# Patient Record
Sex: MALE | Race: WHITE
[De-identification: names, ages, dates, MRNs, and addresses within clinical notes are randomized per-mention and may not be internally consistent; named-entity substitution may affect disease eponyms.]

---

## 2017-02-27 ENCOUNTER — HOSPITAL ENCOUNTER (OUTPATIENT)
Dept: HOSPITAL 47 - RADCTMAIN | Age: 62
End: 2017-02-27
Payer: COMMERCIAL

## 2017-02-27 DIAGNOSIS — K57.30: ICD-10-CM

## 2017-02-27 DIAGNOSIS — N28.1: ICD-10-CM

## 2017-02-27 DIAGNOSIS — N20.0: Primary | ICD-10-CM

## 2017-02-27 PROCEDURE — 74176 CT ABD & PELVIS W/O CONTRAST: CPT

## 2017-02-27 NOTE — CT
EXAMINATION TYPE: CT abdomen pelvis wo con

 

DATE OF EXAM: 2/27/2017 7:48 AM

 

COMPARISON: 8/11/2016

 

INDICATION: Right sided pain

 

DLP: 752 mGycm, Automated exposure control for dose reduction was used.

 

CONTRAST: None

                        Study performed without Oral Contrast

 

TECHNIQUE: Axial images were obtained from above the diaphragm to the pubic rami in the axial plane a
t 5 mm thick sections.  Reconstructed images are reviewed on the computer in the coronal plane.  

 

FINDINGS:

 

Limited CT sections are obtained the lung bases.  The lung bases are clear.

 

CT ABDOMEN:

 

Liver: Normal

 

Spleen: Normal

 

Pancreas: Normal

 

Adrenal glands: The adrenal glands are normal.

 

Gallbladder: Normal  

 

Kidneys: No masses are evident. No hydronephrosis is present.   There is a 1.8 cm cyst measuring -3 H
ounsfield units on the medial upper left kidney.  Couple of punctate cortical medullary calcification
s are within the left kidney. The largest calcifications are within the inferior pole measuring 0.3 a
nd 0.2 cm.

 

Aorta: Normal

 

Inferior vena cava: Normal.

 

CT PELVIS: 

Loops of bowel within the abdomen and pelvis are normal.     Few diverticuli within the sigmoid colon
. No inflammatory changes are adjacent.

 

Appendix: Normal as visualized.

 

Urinary bladder: Normal. 

 

Genitourinary structures: Multiple calcifications are in the slightly prominent prostate. There is so
me subtle asymmetry of the seminal vesicles was slightly larger left than right. This could be furthe
r evaluate with transrectal prostate sonography.

 

Osseous structures: No suspicious lytic or sclerotic lesions.

 

IMPRESSIONS:

1.  Scattered small nonobstructing left renal stones. 

2.  Left renal cyst

3. Few diverticuli within the sigmoid colon without acute diverticulitis.

## 2017-03-02 ENCOUNTER — HOSPITAL ENCOUNTER (OUTPATIENT)
Dept: HOSPITAL 47 - RADXRMAIN | Age: 62
Discharge: HOME | End: 2017-03-02
Attending: PHYSICIAN ASSISTANT
Payer: COMMERCIAL

## 2017-03-02 DIAGNOSIS — N20.0: Primary | ICD-10-CM

## 2017-03-02 PROCEDURE — 74000: CPT

## 2017-03-02 NOTE — XR
EXAMINATION TYPE: XR KUB

 

DATE OF EXAM: 3/2/2017 10:33 AM

 

HISTORY:  Pain

 

Comparison: None.Single KUB is submitted for interpretation.

 

Findings:

Right renal calculi: None Visualized.

 

Right ureteral calculi: None Visualized.  

 

Left renal calculi:  None Visualized.

 

Left ureteral calculi:  None Visualized.

 

Pelvic calcifications:  Phleboliths noted. Prostate calcifications also identified.

 

Bowel gas pattern is unremarkable.  No free air.  No mass effects.

 

IMPRESSION:

 

1.

## 2018-12-25 ENCOUNTER — HOSPITAL ENCOUNTER (EMERGENCY)
Dept: HOSPITAL 47 - EC | Age: 63
Discharge: HOME | End: 2018-12-25
Payer: COMMERCIAL

## 2018-12-25 VITALS — DIASTOLIC BLOOD PRESSURE: 68 MMHG | TEMPERATURE: 98.6 F | HEART RATE: 75 BPM | SYSTOLIC BLOOD PRESSURE: 106 MMHG

## 2018-12-25 VITALS — RESPIRATION RATE: 18 BRPM

## 2018-12-25 DIAGNOSIS — J44.9: ICD-10-CM

## 2018-12-25 DIAGNOSIS — R07.9: Primary | ICD-10-CM

## 2018-12-25 DIAGNOSIS — Z82.49: ICD-10-CM

## 2018-12-25 DIAGNOSIS — R06.00: ICD-10-CM

## 2018-12-25 DIAGNOSIS — J02.9: ICD-10-CM

## 2018-12-25 DIAGNOSIS — E78.00: ICD-10-CM

## 2018-12-25 DIAGNOSIS — F17.200: ICD-10-CM

## 2018-12-25 DIAGNOSIS — Z79.899: ICD-10-CM

## 2018-12-25 DIAGNOSIS — I10: ICD-10-CM

## 2018-12-25 LAB
ALBUMIN SERPL-MCNC: 4.9 G/DL (ref 3.5–5)
ALP SERPL-CCNC: 50 U/L (ref 38–126)
ALT SERPL-CCNC: 37 U/L (ref 21–72)
ANION GAP SERPL CALC-SCNC: 11 MMOL/L
APTT BLD: 23.3 SEC (ref 22–30)
AST SERPL-CCNC: 31 U/L (ref 17–59)
BASOPHILS # BLD AUTO: 0.1 K/UL (ref 0–0.2)
BASOPHILS NFR BLD AUTO: 1 %
BUN SERPL-SCNC: 17 MG/DL (ref 9–20)
CALCIUM SPEC-MCNC: 10.4 MG/DL (ref 8.4–10.2)
CHLORIDE SERPL-SCNC: 103 MMOL/L (ref 98–107)
CK SERPL-CCNC: 102 U/L (ref 55–170)
CO2 SERPL-SCNC: 24 MMOL/L (ref 22–30)
EOSINOPHIL # BLD AUTO: 0.2 K/UL (ref 0–0.7)
EOSINOPHIL NFR BLD AUTO: 3 %
ERYTHROCYTE [DISTWIDTH] IN BLOOD BY AUTOMATED COUNT: 5.02 M/UL (ref 4.3–5.9)
ERYTHROCYTE [DISTWIDTH] IN BLOOD: 11.7 % (ref 11.5–15.5)
GLUCOSE SERPL-MCNC: 117 MG/DL (ref 74–99)
HCT VFR BLD AUTO: 47.9 % (ref 39–53)
HGB BLD-MCNC: 15.3 GM/DL (ref 13–17.5)
INR PPP: 0.9 (ref ?–1.2)
LYMPHOCYTES # SPEC AUTO: 1.3 K/UL (ref 1–4.8)
LYMPHOCYTES NFR SPEC AUTO: 18 %
MAGNESIUM SPEC-SCNC: 1.9 MG/DL (ref 1.6–2.3)
MCH RBC QN AUTO: 30.5 PG (ref 25–35)
MCHC RBC AUTO-ENTMCNC: 32 G/DL (ref 31–37)
MCV RBC AUTO: 95.5 FL (ref 80–100)
MONOCYTES # BLD AUTO: 0.5 K/UL (ref 0–1)
MONOCYTES NFR BLD AUTO: 7 %
NEUTROPHILS # BLD AUTO: 5.2 K/UL (ref 1.3–7.7)
NEUTROPHILS NFR BLD AUTO: 68 %
PLATELET # BLD AUTO: 389 K/UL (ref 150–450)
POTASSIUM SERPL-SCNC: 4.6 MMOL/L (ref 3.5–5.1)
PROT SERPL-MCNC: 8.5 G/DL (ref 6.3–8.2)
PT BLD: 10 SEC (ref 9–12)
SODIUM SERPL-SCNC: 138 MMOL/L (ref 137–145)
TROPONIN I SERPL-MCNC: <0.012 NG/ML (ref 0–0.03)
WBC # BLD AUTO: 7.5 K/UL (ref 3.8–10.6)

## 2018-12-25 PROCEDURE — 82553 CREATINE MB FRACTION: CPT

## 2018-12-25 PROCEDURE — 99285 EMERGENCY DEPT VISIT HI MDM: CPT

## 2018-12-25 PROCEDURE — 85610 PROTHROMBIN TIME: CPT

## 2018-12-25 PROCEDURE — 84484 ASSAY OF TROPONIN QUANT: CPT

## 2018-12-25 PROCEDURE — 83735 ASSAY OF MAGNESIUM: CPT

## 2018-12-25 PROCEDURE — 85025 COMPLETE CBC W/AUTO DIFF WBC: CPT

## 2018-12-25 PROCEDURE — 80053 COMPREHEN METABOLIC PANEL: CPT

## 2018-12-25 PROCEDURE — 93005 ELECTROCARDIOGRAM TRACING: CPT

## 2018-12-25 PROCEDURE — 85730 THROMBOPLASTIN TIME PARTIAL: CPT

## 2018-12-25 PROCEDURE — 82550 ASSAY OF CK (CPK): CPT

## 2018-12-25 PROCEDURE — 36415 COLL VENOUS BLD VENIPUNCTURE: CPT

## 2018-12-25 PROCEDURE — 71046 X-RAY EXAM CHEST 2 VIEWS: CPT

## 2018-12-25 NOTE — ED
General Adult HPI





- General


Source: patient, RN notes reviewed


Mode of arrival: ambulatory


Limitations: no limitations





<David Lilly - Last Filed: 12/25/18 11:56>





<Johan Heredia - Last Filed: 12/25/18 12:50>





- General


Chief complaint: Chest Pain


Stated complaint: FELISHA


Time Seen by Provider: 12/25/18 11:00





- History of Present Illness


Initial comments: 





This is a 63-year-old male with past medical history significant for high 

cholesterol and high blood pressure.  Patient comes in today complaining that 

for a couple days he's had jaw pain and today he started having left-sided 

chest pain.  Patient states he also has had a little bit of a sore throat but 

that is improving.  Patient states he's mildly short of breath.  Patient denies 

any diaphoretic episodes.  Patient denies any abdominal pain.  Patient denies 

any nausea vomiting diarrhea.  Patient denies any palpitations.  Patient denies 

lightheadedness dizziness or near syncopal episode.  Patient denies any 

swelling to the legs or calf tenderness.  Patient denies any previous cardiac 

history. (David Lilly)





- Related Data


 Home Medications











 Medication  Instructions  Recorded  Confirmed


 


Atorvastatin [Lipitor] 10 mg PO DAILY 11/16/15 12/25/18


 


HYDROcodone/APAP 7.5-325MG [Norco 1 tab PO Q8HR PRN 12/25/18 12/25/18





7.5-325]   


 


Metoprolol Tartrate [Lopressor] 25 mg PO DAILY 12/25/18 12/25/18


 


amLODIPine BESYLATE/BENAZEPRIL 1 cap PO DAILY 12/25/18 12/25/18





[amLODIPine BESYLATE/BENAZEPRIL   





10-20 mg]   


 


hydrALAZINE HCL [Apresoline] 25 mg PO BID 12/25/18 12/25/18








 Previous Rx's











 Medication  Instructions  Recorded


 


Albuterol Sulfate [Proair Hfa] 1 - 2 puff INHALATION Q6HR PRN #1 12/25/18





 inhaler 











 Allergies











Allergy/AdvReac Type Severity Reaction Status Date / Time


 


No Known Allergies Allergy   Verified 12/25/18 11:21














Review of Systems


ROS Other: All systems not noted in ROS Statement are negative.





<David Lilly - Last Filed: 12/25/18 11:56>


ROS Other: All systems not noted in ROS Statement are negative.





<Johan Heredia - Last Filed: 12/25/18 12:50>


ROS Statement: 


Those systems with pertinent positive or pertinent negative responses have been 

documented in the HPI.








Past Medical History


Past Medical History: Asthma, Hypertension


Additional Past Medical History / Comment(s): hypercholesteremia; asthma as 

child


History of Any Multi-Drug Resistant Organisms: None Reported


Additional Past Surgical History / Comment(s): colonoscopy


Past Anesthesia/Blood Transfusion Reactions: No Reported Reaction


Past Psychological History: No Psychological Hx Reported


Smoking Status: Light tobacco smoker


Past Alcohol Use History: Daily


Past Drug Use History: None Reported





- Past Family History


  ** Father


Family Medical History: Congestive Heart Failure (CHF), Diabetes Mellitus


Additional Family Medical History / Comment(s): passed at age 62.





<David Lilly - Last Filed: 12/25/18 11:56>





General Exam


Limitations: no limitations





<David Lilly - Last Filed: 12/25/18 11:56>





<Johan Heredia - Last Filed: 12/25/18 12:50>





- General Exam Comments


Initial Comments: 





GENERAL:


Patient is well-developed and well-nourished.  Patient is nontoxic and well-

hydrated and is in mild distress.





ENT:


Neck is soft and supple.  No significant lymphadenopathy is noted.  Oropharynx 

is clear.  Moist mucous membranes.  Neck has full range of motion without 

eliciting any pain.





EYES:


The sclera were anicteric and conjunctiva were pink and moist.  Extraocular 

movements were intact and pupils were equal round and reactive to light.  

Eyelids were unremarkable.





PULMONARY:


Unlabored respirations.  Good breath sounds bilaterally.  No audible rales 

rhonchi or wheezing was noted.





CARDIOVASCULAR:


There is a regular rate and rhythm without any murmurs gallops or rubs.  

Femoral pulses are equal bilaterally





ABDOMEN:


Soft and nontender with normal bowel sounds.  No palpable organomegaly was 

noted.  There is no palpable pulsatile mass.





SKIN:


Skin is clear with no lesions or rashes and otherwise unremarkable.





NEUROLOGIC: 


Patient is alert and oriented 3 cranial nerves II through XII are grossly 

intact motor and sensory are also intact  





MUSCULOSKELETAL:


Normal extremities with adequate strength and full range of motion.  





LYMPHATICS:


No significant lymphadenopathy is noted





PSYCHIATRIC:


Normal psychiatric evaluation.   (David Lilly)





 Vital Signs











  12/25/18 12/25/18 12/25/18





  11:01 11:29 11:30


 


Temperature 98.0 F  


 


Pulse Rate 94  82


 


Respiratory 18  20





Rate   


 


Blood Pressure 158/99  137/82


 


O2 Sat by Pulse 99 97 96





Oximetry   














  12/25/18 12/25/18





  12:00 12:30


 


Temperature  


 


Pulse Rate 77 76


 


Respiratory 18 18





Rate  


 


Blood Pressure 131/79 126/78


 


O2 Sat by Pulse 97 97





Oximetry  














Medical Decision Making





- Lab Data


Result diagrams: 


 12/25/18 11:21





 12/25/18 11:21





<David Lilly - Last Filed: 12/25/18 11:56>





- Lab Data


Result diagrams: 


 12/25/18 11:21





 12/25/18 11:21





<Johan Heredia - Last Filed: 12/25/18 12:50>





- Medical Decision Making





EKG shows normal sinus rhythm at 89 bpm AL interval is 170 QRS is 86 QT 

interval 356 QTC is 433.  Patient's EKG shows no ST segment elevation or 

depression or T wave abnormalities are noted.





Dr. Heredia will be taking care of this patient at 12:00 (David Lilly)


Patient is reevaluated by myself.  He states that he did have mild sharp 

discomfort over the left chest without any diaphoresis, radiation to the 

shoulders.  No upper extremity issues.  Patient does have multiple risk 

factors.  Cardiac work up was unremarkable.  Chest x-ray showed findings of 

COPD.  Patient has a history of COPD.  Patient denies any cardiac history in 

himself.  At this point patient's clinical picture is consistent with atypical 

chest pain in individual with multiple risk factors.  At this point there are 

any high-risk features.  Patient appears comfortable at this time.  Discussed 

with patient that his disposition options are to remain observation for serial 

troponins and further cardiac workup however, patient requested that he be 

discharge.  He does live nearby and is under the watchful eye of his 

significant other.  Patient is warned that if he be discharge she can 

experiencing life-threatening cardiac morbidity or possibly even mortality.  

Patient still insists that he not stay in the hospital for further workup.  He 

will return if he develops any worsening chest pain.  Otherwise he is told to 

follow-up with his primary care doctor for COPD management and possibly 

outpatient cardiac workup.  Patient is understandable and agreeable to this 

disposition.  Patient is given prescription for albuterol inhaler when 

necessary shortness of breath.


 (Johan Heredia)





- Lab Data





 Lab Results











  12/25/18 12/25/18 12/25/18 Range/Units





  11:21 11:21 11:21 


 


WBC   7.5   (3.8-10.6)  k/uL


 


RBC   5.02   (4.30-5.90)  m/uL


 


Hgb   15.3   (13.0-17.5)  gm/dL


 


Hct   47.9   (39.0-53.0)  %


 


MCV   95.5   (80.0-100.0)  fL


 


MCH   30.5   (25.0-35.0)  pg


 


MCHC   32.0   (31.0-37.0)  g/dL


 


RDW   11.7   (11.5-15.5)  %


 


Plt Count   389   (150-450)  k/uL


 


Neutrophils %   68   %


 


Lymphocytes %   18   %


 


Monocytes %   7   %


 


Eosinophils %   3   %


 


Basophils %   1   %


 


Neutrophils #   5.2   (1.3-7.7)  k/uL


 


Lymphocytes #   1.3   (1.0-4.8)  k/uL


 


Monocytes #   0.5   (0-1.0)  k/uL


 


Eosinophils #   0.2   (0-0.7)  k/uL


 


Basophils #   0.1   (0-0.2)  k/uL


 


PT     (9.0-12.0)  sec


 


INR     (<1.2)  


 


APTT     (22.0-30.0)  sec


 


Sodium    138  (137-145)  mmol/L


 


Potassium    4.6  (3.5-5.1)  mmol/L


 


Chloride    103  ()  mmol/L


 


Carbon Dioxide    24  (22-30)  mmol/L


 


Anion Gap    11  mmol/L


 


BUN    17  (9-20)  mg/dL


 


Creatinine    0.74  (0.66-1.25)  mg/dL


 


Est GFR (CKD-EPI)AfAm    >90  (>60 ml/min/1.73 sqM)  


 


Est GFR (CKD-EPI)NonAf    >90  (>60 ml/min/1.73 sqM)  


 


Glucose    117 H  (74-99)  mg/dL


 


Calcium    10.4 H  (8.4-10.2)  mg/dL


 


Magnesium    1.9  (1.6-2.3)  mg/dL


 


Total Bilirubin    0.7  (0.2-1.3)  mg/dL


 


AST    31  (17-59)  U/L


 


ALT    37  (21-72)  U/L


 


Alkaline Phosphatase    50  ()  U/L


 


Total Creatine Kinase  102    ()  U/L


 


CK-MB (CK-2)  1.8    (0.0-2.4)  ng/mL


 


CK-MB (CK-2) Rel Index  1.8    


 


Troponin I  <0.012    (0.000-0.034)  ng/mL


 


Total Protein    8.5 H  (6.3-8.2)  g/dL


 


Albumin    4.9  (3.5-5.0)  g/dL














  12/25/18 Range/Units





  11:21 


 


WBC   (3.8-10.6)  k/uL


 


RBC   (4.30-5.90)  m/uL


 


Hgb   (13.0-17.5)  gm/dL


 


Hct   (39.0-53.0)  %


 


MCV   (80.0-100.0)  fL


 


MCH   (25.0-35.0)  pg


 


MCHC   (31.0-37.0)  g/dL


 


RDW   (11.5-15.5)  %


 


Plt Count   (150-450)  k/uL


 


Neutrophils %   %


 


Lymphocytes %   %


 


Monocytes %   %


 


Eosinophils %   %


 


Basophils %   %


 


Neutrophils #   (1.3-7.7)  k/uL


 


Lymphocytes #   (1.0-4.8)  k/uL


 


Monocytes #   (0-1.0)  k/uL


 


Eosinophils #   (0-0.7)  k/uL


 


Basophils #   (0-0.2)  k/uL


 


PT  10.0  (9.0-12.0)  sec


 


INR  0.9  (<1.2)  


 


APTT  23.3  (22.0-30.0)  sec


 


Sodium   (137-145)  mmol/L


 


Potassium   (3.5-5.1)  mmol/L


 


Chloride   ()  mmol/L


 


Carbon Dioxide   (22-30)  mmol/L


 


Anion Gap   mmol/L


 


BUN   (9-20)  mg/dL


 


Creatinine   (0.66-1.25)  mg/dL


 


Est GFR (CKD-EPI)AfAm   (>60 ml/min/1.73 sqM)  


 


Est GFR (CKD-EPI)NonAf   (>60 ml/min/1.73 sqM)  


 


Glucose   (74-99)  mg/dL


 


Calcium   (8.4-10.2)  mg/dL


 


Magnesium   (1.6-2.3)  mg/dL


 


Total Bilirubin   (0.2-1.3)  mg/dL


 


AST   (17-59)  U/L


 


ALT   (21-72)  U/L


 


Alkaline Phosphatase   ()  U/L


 


Total Creatine Kinase   ()  U/L


 


CK-MB (CK-2)   (0.0-2.4)  ng/mL


 


CK-MB (CK-2) Rel Index   


 


Troponin I   (0.000-0.034)  ng/mL


 


Total Protein   (6.3-8.2)  g/dL


 


Albumin   (3.5-5.0)  g/dL














Disposition





<David Lilly - Last Filed: 12/25/18 11:56>


Is patient prescribed a controlled substance at d/c from ED?: No


Time of Disposition: 12:48





<Johan Heredia - Last Filed: 12/25/18 12:50>


Clinical Impression: 


 Chest pain





Disposition: HOME SELF-CARE


Condition: Good


Instructions:  Chest Pain (ED)


Prescriptions: 


Albuterol Sulfate [Proair Hfa] 1 - 2 puff INHALATION Q6HR PRN #1 inhaler


 PRN Reason: Shortness Of Breath


Referrals: 


Andreina Mclean MD [Primary Care Provider] - 1-2 days

## 2018-12-25 NOTE — XR
EXAMINATION TYPE: XR chest 2V

 

DATE OF EXAM: 12/25/2018

 

COMPARISON: 12/12/2014

 

INDICATION: Chest pain short of breath

 

TECHNIQUE:  Frontal and lateral views of the chest are obtained.

 

FINDINGS:  

The heart size is normal.  

The pulmonary vasculature is normal.

The lungs are clear.  There is hyperinflation and flattening of diaphragms which could be related to 
COPD.

 

IMPRESSION:  

1. No acute pulmonary process.

2. Clinical correlation recommended for COPD.

## 2019-01-01 ENCOUNTER — HOSPITAL ENCOUNTER (EMERGENCY)
Dept: HOSPITAL 47 - EC | Age: 64
Discharge: HOME | End: 2019-01-01
Payer: COMMERCIAL

## 2019-01-01 VITALS — TEMPERATURE: 98.3 F

## 2019-01-01 VITALS — RESPIRATION RATE: 16 BRPM | HEART RATE: 91 BPM | DIASTOLIC BLOOD PRESSURE: 79 MMHG | SYSTOLIC BLOOD PRESSURE: 141 MMHG

## 2019-01-01 DIAGNOSIS — J32.4: Primary | ICD-10-CM

## 2019-01-01 DIAGNOSIS — I10: ICD-10-CM

## 2019-01-01 DIAGNOSIS — Z79.899: ICD-10-CM

## 2019-01-01 DIAGNOSIS — E78.00: ICD-10-CM

## 2019-01-01 DIAGNOSIS — F17.200: ICD-10-CM

## 2019-01-01 DIAGNOSIS — J44.9: ICD-10-CM

## 2019-01-01 LAB
ALBUMIN SERPL-MCNC: 4.8 G/DL (ref 3.5–5)
ALP SERPL-CCNC: 47 U/L (ref 38–126)
ALT SERPL-CCNC: 38 U/L (ref 21–72)
ANION GAP SERPL CALC-SCNC: 10 MMOL/L
AST SERPL-CCNC: 27 U/L (ref 17–59)
BASOPHILS # BLD AUTO: 0.1 K/UL (ref 0–0.2)
BASOPHILS NFR BLD AUTO: 1 %
BUN SERPL-SCNC: 14 MG/DL (ref 9–20)
CALCIUM SPEC-MCNC: 10 MG/DL (ref 8.4–10.2)
CHLORIDE SERPL-SCNC: 103 MMOL/L (ref 98–107)
CO2 SERPL-SCNC: 24 MMOL/L (ref 22–30)
EOSINOPHIL # BLD AUTO: 0.3 K/UL (ref 0–0.7)
EOSINOPHIL NFR BLD AUTO: 4 %
ERYTHROCYTE [DISTWIDTH] IN BLOOD BY AUTOMATED COUNT: 4.77 M/UL (ref 4.3–5.9)
ERYTHROCYTE [DISTWIDTH] IN BLOOD: 11.9 % (ref 11.5–15.5)
GLUCOSE SERPL-MCNC: 106 MG/DL (ref 74–99)
HCT VFR BLD AUTO: 45.4 % (ref 39–53)
HGB BLD-MCNC: 15 GM/DL (ref 13–17.5)
LYMPHOCYTES # SPEC AUTO: 1.5 K/UL (ref 1–4.8)
LYMPHOCYTES NFR SPEC AUTO: 21 %
MCH RBC QN AUTO: 31.4 PG (ref 25–35)
MCHC RBC AUTO-ENTMCNC: 33 G/DL (ref 31–37)
MCV RBC AUTO: 95.1 FL (ref 80–100)
MONOCYTES # BLD AUTO: 0.4 K/UL (ref 0–1)
MONOCYTES NFR BLD AUTO: 6 %
NEUTROPHILS # BLD AUTO: 4.7 K/UL (ref 1.3–7.7)
NEUTROPHILS NFR BLD AUTO: 66 %
PLATELET # BLD AUTO: 297 K/UL (ref 150–450)
POTASSIUM SERPL-SCNC: 4.4 MMOL/L (ref 3.5–5.1)
PROT SERPL-MCNC: 7.9 G/DL (ref 6.3–8.2)
SODIUM SERPL-SCNC: 137 MMOL/L (ref 137–145)
WBC # BLD AUTO: 7.1 K/UL (ref 3.8–10.6)

## 2019-01-01 PROCEDURE — 71046 X-RAY EXAM CHEST 2 VIEWS: CPT

## 2019-01-01 PROCEDURE — 70491 CT SOFT TISSUE NECK W/DYE: CPT

## 2019-01-01 PROCEDURE — 99285 EMERGENCY DEPT VISIT HI MDM: CPT

## 2019-01-01 PROCEDURE — 93005 ELECTROCARDIOGRAM TRACING: CPT

## 2019-01-01 PROCEDURE — 87430 STREP A AG IA: CPT

## 2019-01-01 PROCEDURE — 80053 COMPREHEN METABOLIC PANEL: CPT

## 2019-01-01 PROCEDURE — 36415 COLL VENOUS BLD VENIPUNCTURE: CPT

## 2019-01-01 PROCEDURE — 87081 CULTURE SCREEN ONLY: CPT

## 2019-01-01 PROCEDURE — 85025 COMPLETE CBC W/AUTO DIFF WBC: CPT

## 2019-01-01 NOTE — CT
EXAMINATION TYPE: CT soft tissue neck w con

 

DATE OF EXAM: 1/1/2019 4:47 PM

 

COMPARISON: None

 

HISTORY: Swollen throat, FELISHA

 

CT DLP: 233.9 mGycm

Automated exposure control for dose reduction was used.

 

CONTRAST: 

CT scan of the neck is performed following with IV Contrast, patient injected with 100 mL of Isovue 3
00.  Axial images are obtained, coronal and sagittal reformatted images are reviewed.

 

FINDINGS:

 

There is normal branching pattern of the great vessels on the aortic arch. Thyroid gland is symmetric
. There is contrast opacification of the carotid arteries and jugular veins. There is bilateral contr
ast opacification of the vertebral arteries. Epiglottis appears normal. Visualized trachea appears no
rmal. There is no evidence of a pharyngeal mass. There is mucosal thickening in the maxillary and eth
moid and sphenoid sinuses.

 

Submandibular salivary glands are symmetric. Parotid glands are symmetric. There is bilateral calcifi
cation within the tonsils. There is also some calcification more laterally on the right and left side
 that raises the possibility of a parotid duct stone.

 

Cervical vertebra have fairly normal spacing and alignment. Posterior elements are intact. Prevertebr
al soft tissues are not enlarged.

IMPRESSION:  Pansinusitis.

 

Old granulomatous disease with calcification in the tonsils.

 

Possible stone in the right and left parotid duct.

## 2019-01-01 NOTE — XR
EXAMINATION TYPE: XR chest 2V

 

DATE OF EXAM: 1/1/2019

 

COMPARISON: 12/25/2018

 

HISTORY: Difficulty swallowing

 

TECHNIQUE:  Frontal and lateral views of the chest are obtained.

 

FINDINGS:  Heart and mediastinum are normal. Lungs are clear. Diaphragm is normal. Bony thorax appear
s normal.

 

IMPRESSION:  Normal chest. No change.

## 2019-01-15 ENCOUNTER — HOSPITAL ENCOUNTER (OUTPATIENT)
Dept: HOSPITAL 47 - 1SOBS | Age: 64
Setting detail: OBSERVATION
LOS: 1 days | Discharge: HOME | End: 2019-01-16
Payer: COMMERCIAL

## 2019-01-15 VITALS — RESPIRATION RATE: 18 BRPM

## 2019-01-15 DIAGNOSIS — E66.9: ICD-10-CM

## 2019-01-15 DIAGNOSIS — I10: ICD-10-CM

## 2019-01-15 DIAGNOSIS — Z79.899: ICD-10-CM

## 2019-01-15 DIAGNOSIS — Z79.891: ICD-10-CM

## 2019-01-15 DIAGNOSIS — R10.13: ICD-10-CM

## 2019-01-15 DIAGNOSIS — R14.0: ICD-10-CM

## 2019-01-15 DIAGNOSIS — R07.9: Primary | ICD-10-CM

## 2019-01-15 DIAGNOSIS — Z83.3: ICD-10-CM

## 2019-01-15 DIAGNOSIS — E78.5: ICD-10-CM

## 2019-01-15 DIAGNOSIS — Z87.09: ICD-10-CM

## 2019-01-15 DIAGNOSIS — Z87.891: ICD-10-CM

## 2019-01-15 DIAGNOSIS — Z82.49: ICD-10-CM

## 2019-01-15 DIAGNOSIS — F10.10: ICD-10-CM

## 2019-01-15 DIAGNOSIS — E78.00: ICD-10-CM

## 2019-01-15 DIAGNOSIS — J44.9: ICD-10-CM

## 2019-01-15 LAB
ALBUMIN SERPL-MCNC: 4.8 G/DL (ref 3.5–5)
ALP SERPL-CCNC: 43 U/L (ref 38–126)
ALT SERPL-CCNC: 43 U/L (ref 21–72)
ANION GAP SERPL CALC-SCNC: 12 MMOL/L
AST SERPL-CCNC: 31 U/L (ref 17–59)
BASOPHILS # BLD AUTO: 0.1 K/UL (ref 0–0.2)
BASOPHILS NFR BLD AUTO: 1 %
BUN SERPL-SCNC: 8 MG/DL (ref 9–20)
CALCIUM SPEC-MCNC: 10 MG/DL (ref 8.4–10.2)
CHLORIDE SERPL-SCNC: 102 MMOL/L (ref 98–107)
CK SERPL-CCNC: 85 U/L (ref 55–170)
CO2 SERPL-SCNC: 24 MMOL/L (ref 22–30)
EOSINOPHIL # BLD AUTO: 0.5 K/UL (ref 0–0.7)
EOSINOPHIL NFR BLD AUTO: 6 %
ERYTHROCYTE [DISTWIDTH] IN BLOOD BY AUTOMATED COUNT: 4.98 M/UL (ref 4.3–5.9)
ERYTHROCYTE [DISTWIDTH] IN BLOOD: 11.6 % (ref 11.5–15.5)
GLUCOSE SERPL-MCNC: 112 MG/DL (ref 74–99)
HCT VFR BLD AUTO: 47.5 % (ref 39–53)
HGB BLD-MCNC: 15.4 GM/DL (ref 13–17.5)
LYMPHOCYTES # SPEC AUTO: 1.6 K/UL (ref 1–4.8)
LYMPHOCYTES NFR SPEC AUTO: 20 %
MCH RBC QN AUTO: 30.9 PG (ref 25–35)
MCHC RBC AUTO-ENTMCNC: 32.4 G/DL (ref 31–37)
MCV RBC AUTO: 95.3 FL (ref 80–100)
MONOCYTES # BLD AUTO: 0.5 K/UL (ref 0–1)
MONOCYTES NFR BLD AUTO: 6 %
NEUTROPHILS # BLD AUTO: 5.5 K/UL (ref 1.3–7.7)
NEUTROPHILS NFR BLD AUTO: 66 %
PLATELET # BLD AUTO: 376 K/UL (ref 150–450)
POTASSIUM SERPL-SCNC: 4.4 MMOL/L (ref 3.5–5.1)
PROT SERPL-MCNC: 8 G/DL (ref 6.3–8.2)
SODIUM SERPL-SCNC: 138 MMOL/L (ref 137–145)
TROPONIN I SERPL-MCNC: <0.012 NG/ML (ref 0–0.03)
WBC # BLD AUTO: 8.3 K/UL (ref 3.8–10.6)

## 2019-01-15 PROCEDURE — 96372 THER/PROPH/DIAG INJ SC/IM: CPT

## 2019-01-15 PROCEDURE — 85379 FIBRIN DEGRADATION QUANT: CPT

## 2019-01-15 PROCEDURE — 85025 COMPLETE CBC W/AUTO DIFF WBC: CPT

## 2019-01-15 PROCEDURE — 93005 ELECTROCARDIOGRAM TRACING: CPT

## 2019-01-15 PROCEDURE — 82550 ASSAY OF CK (CPK): CPT

## 2019-01-15 PROCEDURE — 82553 CREATINE MB FRACTION: CPT

## 2019-01-15 PROCEDURE — 76705 ECHO EXAM OF ABDOMEN: CPT

## 2019-01-15 PROCEDURE — 71046 X-RAY EXAM CHEST 2 VIEWS: CPT

## 2019-01-15 PROCEDURE — 84484 ASSAY OF TROPONIN QUANT: CPT

## 2019-01-15 PROCEDURE — 80053 COMPREHEN METABOLIC PANEL: CPT

## 2019-01-15 PROCEDURE — 93306 TTE W/DOPPLER COMPLETE: CPT

## 2019-01-15 PROCEDURE — 83880 ASSAY OF NATRIURETIC PEPTIDE: CPT

## 2019-01-15 RX ADMIN — HEPARIN SODIUM SCH UNIT: 5000 INJECTION, SOLUTION INTRAVENOUS; SUBCUTANEOUS at 20:50

## 2019-01-15 NOTE — XR
EXAMINATION: XR chest 2V

DATE AND TIME:  1/15/2019 5:44 PM

 

CLINICAL INDICATION: [facility]; direct admit chest pain

 

TECHNIQUE: Departmental protocol

 

COMPARISON: 1/1/2019

 

FINDINGS: 

The lungs are clear. 

 

The pleural spaces are negative.

 

The cardiac silhouette is not enlarged. The remainder of the mediastinal silhouette is unremarkable. 


 

The skeletal structures and soft tissues are negative for acute findings.

 

IMPRESSION:

NO ACUTE PROCESS.

## 2019-01-16 VITALS — DIASTOLIC BLOOD PRESSURE: 78 MMHG | SYSTOLIC BLOOD PRESSURE: 124 MMHG | TEMPERATURE: 98.2 F | HEART RATE: 64 BPM

## 2019-01-16 LAB
ALBUMIN SERPL-MCNC: 4.1 G/DL (ref 3.5–5)
ALP SERPL-CCNC: 39 U/L (ref 38–126)
ALT SERPL-CCNC: 39 U/L (ref 21–72)
ANION GAP SERPL CALC-SCNC: 7 MMOL/L
AST SERPL-CCNC: 26 U/L (ref 17–59)
BASOPHILS # BLD AUTO: 0.1 K/UL (ref 0–0.2)
BASOPHILS NFR BLD AUTO: 1 %
BUN SERPL-SCNC: 10 MG/DL (ref 9–20)
CALCIUM SPEC-MCNC: 9.9 MG/DL (ref 8.4–10.2)
CHLORIDE SERPL-SCNC: 105 MMOL/L (ref 98–107)
CO2 SERPL-SCNC: 28 MMOL/L (ref 22–30)
EOSINOPHIL # BLD AUTO: 0.4 K/UL (ref 0–0.7)
EOSINOPHIL NFR BLD AUTO: 7 %
ERYTHROCYTE [DISTWIDTH] IN BLOOD BY AUTOMATED COUNT: 4.49 M/UL (ref 4.3–5.9)
ERYTHROCYTE [DISTWIDTH] IN BLOOD: 11.9 % (ref 11.5–15.5)
GLUCOSE SERPL-MCNC: 106 MG/DL (ref 74–99)
HCT VFR BLD AUTO: 42.8 % (ref 39–53)
HGB BLD-MCNC: 14.5 GM/DL (ref 13–17.5)
LYMPHOCYTES # SPEC AUTO: 1.5 K/UL (ref 1–4.8)
LYMPHOCYTES NFR SPEC AUTO: 23 %
MCH RBC QN AUTO: 32.2 PG (ref 25–35)
MCHC RBC AUTO-ENTMCNC: 33.8 G/DL (ref 31–37)
MCV RBC AUTO: 95.3 FL (ref 80–100)
MONOCYTES # BLD AUTO: 0.4 K/UL (ref 0–1)
MONOCYTES NFR BLD AUTO: 6 %
NEUTROPHILS # BLD AUTO: 3.8 K/UL (ref 1.3–7.7)
NEUTROPHILS NFR BLD AUTO: 61 %
PLATELET # BLD AUTO: 341 K/UL (ref 150–450)
POTASSIUM SERPL-SCNC: 4.8 MMOL/L (ref 3.5–5.1)
PROT SERPL-MCNC: 6.7 G/DL (ref 6.3–8.2)
SODIUM SERPL-SCNC: 140 MMOL/L (ref 137–145)
WBC # BLD AUTO: 6.2 K/UL (ref 3.8–10.6)

## 2019-01-16 RX ADMIN — HEPARIN SODIUM SCH UNIT: 5000 INJECTION, SOLUTION INTRAVENOUS; SUBCUTANEOUS at 10:42

## 2019-01-16 NOTE — P.CRDCN
History of Present Illness


History of present illness: 


This is a pleasant 63-year-old  male past medical history significant 

for dyslipidemia, COPD, hypertension and daily alcohol abuse.  He was sent to 

the hospital by his primary care physician for symptoms of epigastric pain and 

bloating.  We have been asked to see him in consultation for chest discomfort.  

He denies ever having any heavy pressure sensation in the precordial region.  

He states every time after he eat he feels his stomach becomes extremely 

bloated and he is a tender sensation to the right upper quadrant.  He denies 

pain in the back, arms, neck or jaw.  He denies associated shortness of breath, 

dizziness, palpitations, nausea, vomiting or diaphoresis.





EKG reveals sinus mechanism.


Chest x-ray is negative for an acute cardiopulmonary process.


Laboratory data reviewed, WBC 6.2, hemoglobin 14.5, platelets 341, d-dimer 0.23

, sodium 140, potassium 4.8, creatinine 0.77, cardiac enzymes negative 3, 

NTproBNP 49.


Current cardiac medications include atorvastatin 10 mg daily, Lopressor 25 mg 

daily, hydralazine 25 mg twice a day and amlodipine/benazepril 10/20 mg daily.





At the time of my exam:


CONSTITUTIONAL: Denies fever. Denies chills.


EYES: Denies blurred vision. Denies vision changes. Denies eye pain.


EARS, NOSE, MOUTH & THROAT: Denies headache. Denies sore throat. Denies ear 

pain.


CARDIOVASCULAR: Denies chest pain. Denies shortness of breath. Denies 

orthopnea. Denies PND. Denies palpitations.


RESPIRATORY: Denies cough. 


GASTROINTESTINAL: Denies abdominal pain. Denies diarrhea. Denies constipation. 

Denies nausea. Denies vomiting.


MUSCULOSKELETAL: Denies myalgias.


INTEGUMENTARY: Denies pruitis. Denies rash.


NEUROLOGIC: Denies numbness. Denies tingling. Denies weakness.


PSYCHIATRIC: Denies anxiety. Denies depression.


ENDOCRINE: Denies fatigue. Denies weight change. Denies polydipsia. Denies 

polyurina.


GENITOURINARY: Denies burning, hematuria or urgency with micturation.


HEMATOLOGIC: Denies history of anemia. Denies bleeding. 





Blood pressure 121/76 heart rate 69 afebrile maintaining oxygen saturation on 

room air


GENERAL: This is a 63-year-old  male in no apparent distress at the 

time of my examination.


HEENT: Head is atraumatic, normocephalic. Pupils are equal, round. Sclerae 

anicteric. Conjunctivae are clear. Mucous membranes of the mouth are moist. 

Neck is supple. There is no jugular venous distention. No carotid bruit is 

heard.


LUNGS: Clear to auscultation no wheezes, rales or rhonchi. No chest wall 

tenderness is noted on palpation or with deep breathing.


HEART: Regular rate and rhythm without murmurs, rubs or gallops. S1 and S2 

heard.


ABDOMEN: Soft, nontender. Bowel sounds are heard. No organomegaly noted.


EXTREMITIES: No evidence of peripheral edema and no calf tenderness noted.


VASCULAR: Radial and dorsalis pedis pulses palpated, no evidence of clubbing.  


NEUROLOGIC: Patient is awake, alert and oriented x3.


 


ASSESSMENT


Epigastric and abdominal pain with bloating


Hypertension


Dyslipidemia


COPD


Daily alcohol intake


Former nicotine dependence





PLAN


Obtain ultrasound of the gallbladder and 2D echocardiogram. 


An acute coronary event has been ruled out. 


Symptoms not suggestive of angina. 


Stable from a cardiac perspective. 


Ongoing medical management of abdominal discomfort. 


Follow up with Dr. Dang in 3-4 weeks and will undergo outpatient stress 

testing once abdominal discomfort has been addressed. 


Alcohol cessation recommended. 


Thank you kindly for this consultation. 





Nurse Practitioner note has been reviewed, I agree with a documented findings 

and plan of care.  Patient was seen and examined.











Past Medical History


Past Medical History: Asthma, COPD, Hyperlipidemia, Hypertension


Additional Past Medical History / Comment(s): hypercholesteremia; asthma as 

child


History of Any Multi-Drug Resistant Organisms: None Reported


Additional Past Surgical History / Comment(s): colonoscopy, I&d lt axilla 

abcess in 2015 bx neg.


Past Anesthesia/Blood Transfusion Reactions: No Reported Reaction


Smoking Status: Former smoker





- Past Family History


  ** Mother


History Unknown: Yes





  ** Father


Family Medical History: Congestive Heart Failure (CHF), Diabetes Mellitus


Additional Family Medical History / Comment(s): passed at age 62.





Medications and Allergies


 Home Medications











 Medication  Instructions  Recorded  Confirmed  Type


 


Atorvastatin [Lipitor] 10 mg PO HS 11/16/15 01/15/19 History


 


HYDROcodone/APAP 7.5-325MG [Norco 1 tab PO TID PRN 12/25/18 01/15/19 History





7.5-325]    


 


Metoprolol Tartrate [Lopressor] 25 mg PO DAILY 12/25/18 01/15/19 History


 


amLODIPine BESYLATE/BENAZEPRIL 1 cap PO DAILY 12/25/18 01/15/19 History





[amLODIPine BESYLATE/BENAZEPRIL    





10-20 mg]    


 


hydrALAZINE HCL [Apresoline] 25 mg PO BID 12/25/18 01/15/19 History


 


Albuterol Inhaler [Ventolin Hfa 2 puff INHALATION RT-QID 01/01/19 01/15/19 

History





Inhaler]    











 Allergies











Allergy/AdvReac Type Severity Reaction Status Date / Time


 


No Known Allergies Allergy   Verified 01/15/19 20:37














Physical Exam


Vitals: 


 Vital Signs











  Temp Pulse Resp BP BP Pulse Ox


 


 01/16/19 07:25  97.9 F  69  18  121/76   96


 


 01/16/19 04:00  98 F  73  18   129/79  96


 


 01/16/19 00:00    18   


 


 01/15/19 23:54  97.6 F  80  18   119/79  97


 


 01/15/19 20:00    18   


 


 01/15/19 17:00  97.5 F L  99  18   124/88  97








 Intake and Output











 01/15/19 01/16/19 01/16/19





 22:59 06:59 14:59


 


Intake Total 236  


 


Balance 236  


 


Intake:   


 


  Oral 236  


 


Other:   


 


  Voiding Method Toilet Toilet 


 


  # Voids  2 


 


  Weight 70.76 kg  














Results





 01/16/19 05:22





 01/16/19 05:22


 Cardiac Enzymes











  01/15/19 01/15/19 01/15/19 Range/Units





  17:29 17:29 23:19 


 


AST  31    (17-59)  U/L


 


CK-MB (CK-2)   1.6   (0.0-2.4)  ng/mL


 


Troponin I   <0.012  <0.012  (0.000-0.034)  ng/mL














  01/16/19 Range/Units





  05:22 


 


AST   (17-59)  U/L


 


CK-MB (CK-2)   (0.0-2.4)  ng/mL


 


Troponin I  <0.012  (0.000-0.034)  ng/mL








 CBC











  01/15/19 Range/Units





  17:29 


 


WBC  8.3  (3.8-10.6)  k/uL


 


RBC  4.98  (4.30-5.90)  m/uL


 


Hgb  15.4  (13.0-17.5)  gm/dL


 


Hct  47.5  (39.0-53.0)  %


 


Plt Count  376  (150-450)  k/uL








 Comprehensive Metabolic Panel











  01/15/19 Range/Units





  17:29 


 


Sodium  138  (137-145)  mmol/L


 


Potassium  4.4  (3.5-5.1)  mmol/L


 


Chloride  102  ()  mmol/L


 


Carbon Dioxide  24  (22-30)  mmol/L


 


BUN  8 L  (9-20)  mg/dL


 


Creatinine  0.69  (0.66-1.25)  mg/dL


 


Glucose  112 H  (74-99)  mg/dL


 


Calcium  10.0  (8.4-10.2)  mg/dL


 


AST  31  (17-59)  U/L


 


ALT  43  (21-72)  U/L


 


Alkaline Phosphatase  43  ()  U/L


 


Total Protein  8.0  (6.3-8.2)  g/dL


 


Albumin  4.8  (3.5-5.0)  g/dL








 Current Medications











Generic Name Dose Route Start Last Admin





  Trade Name Freq  PRN Reason Stop Dose Admin


 


Hydrocodone Bitart/Acetaminophen  1 each  01/15/19 18:01  





  [place] 7.5-325  PO   





  Q8HR PRN   





  Pain   





     





     





     


 


Amlodipine Besylate  10 mg  01/16/19 09:00  





  Norvasc  PO   





  DAILY [facility]   





     





     





     





     


 


Atorvastatin Calcium  10 mg  01/15/19 21:00  01/15/19 20:50





  Lipitor  PO   10 mg





  HS MEGAN   Administration





     





     





     





     


 


Heparin Sodium (Porcine)  5,000 unit  01/15/19 21:00  01/15/19 20:50





  Heparin  SQ   5,000 unit





  Q12HR MEGAN   Administration





     





     





     





     


 


Hydralazine HCl  25 mg  01/15/19 21:00  01/15/19 20:50





  Apresoline  PO   25 mg





  BID MEGAN   Administration





     





     





     





     


 


Sodium Chloride  1,000 mls @ 20 mls/hr  01/15/19 17:30  





  Saline 0.9%  IV   





  .Q24H MEGAN   





     





     





     





     


 


Lisinopril  20 mg  01/16/19 09:00  





  Zestril  PO   





  DAILY [facility]   





     





     





     





     


 


Metoprolol Tartrate  25 mg  01/16/19 09:00  





  Lopressor  PO   





  DAILY [facility]   





     





     





     





     


 


Sodium Chloride  2 spray  01/16/19 03:39  01/16/19 03:53





  [place]  NASAL   2 spray





  QID PRN   Administration





  Congestion   





     





     





     








 Intake and Output











 01/15/19 01/16/19 01/16/19





 22:59 06:59 14:59


 


Intake Total 236  


 


Balance 236  


 


Intake:   


 


  Oral 236  


 


Other:   


 


  Voiding Method Toilet Toilet 


 


  # Voids  2 


 


  Weight 70.76 kg  








 





 01/15/19 17:29 





 01/15/19 17:29

## 2019-01-16 NOTE — US
EXAMINATION TYPE: US gallbladder

 

DATE OF EXAM: 1/16/2019

 

COMPARISON: None

 

CLINICAL HISTORY: abdominal pain. Bloating, acid reflex, pain

 

EXAM MEASUREMENTS:

 

Liver Length:  15.8 cm   

Gallbladder Wall:  0.2 cm   

CHD:  0.4 cm

Right Kidney:  9.7 x 5.4 x 5.4 cm

 

 

 

Pancreas:  Obscured by bowel gas

Liver:  wnl  

Gallbladder:  wnl

**Evidence for sonographic Alfaro's sign:  neg

CBD:  Obscured by overlying bowel gas 

CHD: wnl

Right Kidney:  wnl 

 

 

 

IMPRESSION: 

1. No suspicious acute changes.

## 2019-01-16 NOTE — P.DS
Providers


Date of admission: 


01/15/19 16:40





Expected date of discharge: 01/16/19


Attending physician: 


Andreina Mclean





Consults: 





 





01/15/19 17:16


Consult Physician Routine 


   Consulting Provider: Maddie Dang


   Consult Reason/Comments: chest pain


   Do you want consulting provider notified?: Yes


   Placement Type Exists?: Yes











Primary care physician: 


Andreina Caridad





[facility] Course: 





Discharge diagnosis


1.  Chest pain.  Troponins negative.  EKG ordered.  Cardiology services 

consulted.  Ultrasound of gallbladder completed showing no suspicious acute 

changes.  Chest x-ray completed showing no acute process.  Per cardiology and 

acute coronary event has been ruled out.  Patient to follow-up with Dr. Dang 

in 3-4 weeks and will undergo outpatient stress testing once abdominal 

discomfort has been addressed.  At this time will DC patient on Protonix twice 

a day and patient to follow-up with primary care provider





2.  History of asthma





3.  History of COPD





4.  History of hyperlipidemia





5.  History of essential hypertension.











Hospital course





This is a 63-year-old male patient who presented to the hospital with 

complaints of chest pain.  Patient states this has been occurring 

intermittently over the past week.  Patient states he often feels bloated in 

has increased belching that occurs with the chest pain.  Patient has a past 

medical history of asthma, COPD, hyperlipidemia, hypertension and ex-smoker.  

Chest x-ray completed showing no acute process.  Troponins negative.  EKG 

ordered.  Cardiology service is consulted.  Gallbladder ultrasound completed 

showing no suspicious acute changes.  At this time patient is asymptomatic.  

Patient denies chest pain or shortness of breath.  Patient denies nausea 

vomiting or diarrhea.  Patient denies any urinary burning or frequency





On 01/16/2019 patient is resting comfortably in bed.  Patient is alert and 

oriented 3.  Patient has been cleared from cardiology standpoint.  Ultrasound 

of gallbladder completed showing no suspicious acute changes.  Patient will be 

DC'd home on Protonix twice a day.  Patient advised to follow up with PCP and 

cardiology services for outpatient stress test in 3-4 weeks.  At this time 

patient denies chest pain or shortness breath.  Patient denies nausea vomiting 

or diarrhea.  Patient denies any urinary burning or frequency





I performed an examination of the patient and discussed their management with 

the Nurse Practitioner.  I have reviewed the Nurse Practitioner's notes and 

agree with the documented findings and plan of care


Patient Condition at Discharge: Stable





Plan - Discharge Summary


Discharge Rx Participant: No


New Discharge Prescriptions: 


No Action


   Atorvastatin [Lipitor] 10 mg PO HS


   hydrALAZINE HCL [Apresoline] 25 mg PO BID


   amLODIPine BESYLATE/BENAZEPRIL [amLODIPine BESYLATE/BENAZEPRIL 10-20 mg] 1 

cap PO DAILY


   Metoprolol Tartrate [Lopressor] 25 mg PO DAILY


   HYDROcodone/APAP 7.5-325MG [Norco 7.5-325] 1 tab PO TID PRN


     PRN Reason: Pain


   Albuterol Inhaler [Ventolin Hfa Inhaler] 2 puff INHALATION RT-QID


Discharge Medication List





Atorvastatin [Lipitor] 10 mg PO HS 11/16/15 [History]


HYDROcodone/APAP 7.5-325MG [Norco 7.5-325] 1 tab PO TID PRN 12/25/18 [History]


Metoprolol Tartrate [Lopressor] 25 mg PO DAILY 12/25/18 [History]


amLODIPine BESYLATE/BENAZEPRIL [amLODIPine BESYLATE/BENAZEPRIL 10-20 mg] 1 cap 

PO DAILY 12/25/18 [History]


hydrALAZINE HCL [Apresoline] 25 mg PO BID 12/25/18 [History]


Albuterol Inhaler [Ventolin Hfa Inhaler] 2 puff INHALATION RT-QID 01/01/19 [

History]








Follow up Appointment(s)/Referral(s): 


Maddie Dang MD [STAFF PHYSICIAN] - 3 Weeks

## 2019-01-16 NOTE — P.HPIM
History of Present Illness


H&P Date: 01/16/19





This is a 63-year-old male patient who presented to the hospital with 

complaints of chest pain.  Patient states this has been occurring 

intermittently over the past week.  Patient states he often feels bloated in 

has increased belching that occurs with the chest pain.  Patient has a past 

medical history of asthma, COPD, hyperlipidemia, hypertension and ex-smoker.  

Chest x-ray completed showing no acute process.  Troponins negative.  EKG 

ordered.  Cardiology service is consulted.  Gallbladder ultrasound completed 

showing no suspicious acute changes.  At this time patient is asymptomatic.  

Patient denies chest pain or shortness of breath.  Patient denies nausea 

vomiting or diarrhea.  Patient denies any urinary burning or frequency





Review of Systems





Please refer to HPI otherwise unremarkable





Past Medical History


Past Medical History: Asthma, COPD, Hyperlipidemia, Hypertension


Additional Past Medical History / Comment(s): hypercholesteremia; asthma as 

child


History of Any Multi-Drug Resistant Organisms: None Reported


Additional Past Surgical History / Comment(s): colonoscopy, I&d lt axilla 

abcess in 2015 bx neg.


Past Anesthesia/Blood Transfusion Reactions: No Reported Reaction


Smoking Status: Former smoker





- Past Family History


  ** Mother


History Unknown: Yes





  ** Father


Family Medical History: Congestive Heart Failure (CHF), Diabetes Mellitus


Additional Family Medical History / Comment(s): passed at age 62.





Medications and Allergies


 Home Medications











 Medication  Instructions  Recorded  Confirmed  Type


 


Atorvastatin [Lipitor] 10 mg PO HS 11/16/15 01/15/19 History


 


HYDROcodone/APAP 7.5-325MG [Norco 1 tab PO TID PRN 12/25/18 01/15/19 History





7.5-325]    


 


Metoprolol Tartrate [Lopressor] 25 mg PO DAILY 12/25/18 01/15/19 History


 


amLODIPine BESYLATE/BENAZEPRIL 1 cap PO DAILY 12/25/18 01/15/19 History





[amLODIPine BESYLATE/BENAZEPRIL    





10-20 mg]    


 


hydrALAZINE HCL [Apresoline] 25 mg PO BID 12/25/18 01/15/19 History


 


Albuterol Inhaler [Ventolin Hfa 2 puff INHALATION RT-QID 01/01/19 01/15/19 

History





Inhaler]    











 Allergies











Allergy/AdvReac Type Severity Reaction Status Date / Time


 


No Known Allergies Allergy   Verified 01/15/19 20:37














Physical Exam


Vitals: 


 Vital Signs











  Temp Pulse Resp BP BP Pulse Ox


 


 01/16/19 07:25  97.9 F  69  18  121/76   96


 


 01/16/19 04:00  98 F  73  18   129/79  96


 


 01/16/19 00:00    18   


 


 01/15/19 23:54  97.6 F  80  18   119/79  97


 


 01/15/19 20:00    18   


 


 01/15/19 17:00  97.5 F L  99  18   124/88  97








 Intake and Output











 01/15/19 01/16/19 01/16/19





 22:59 06:59 14:59


 


Intake Total 236  


 


Balance 236  


 


Intake:   


 


  Oral 236  


 


Other:   


 


  Voiding Method Toilet Toilet 


 


  # Voids  2 


 


  Weight 70.76 kg  














Head normocephalic


Neck supple


Lungs clear to auscultation bilaterally no wheezing or crackles


Heart regular rate and rhythm S1-S2, no rub or gallop


Abdomen is soft nontender nondistended positive bowel sounds no 

hepatosplenomegaly


Extremities no edema


Neuro alert and orientated to 3





Results


CBC & Chem 7: 


 01/16/19 05:22





 01/16/19 05:22


Labs: 


 Abnormal Lab Results - Last 24 Hours (Table)











  01/15/19 01/16/19 Range/Units





  17:29 05:22 


 


BUN  8 L   (9-20)  mg/dL


 


Glucose  112 H  106 H  (74-99)  mg/dL














Thrombosis Risk Factor Assmnt





- Choose All That Apply


Any of the Below Risk Factors Present?: Yes


Each Factor Represents 1 point: Abnormal pulmonary function (COPD), Obesity (

BMI >25)


Other Risk Factors: Yes


Each Risk Factor Represents 2 Points: Age 61-74 years


Thrombosis Risk Factor Assessment Total Risk Factor Score: 4


Thrombosis Risk Factor Assessment Level: Moderate Risk





Assessment and Plan


Assessment: 





1.  Chest pain.  Troponins negative.  EKG ordered.  Cardiology services 

consulted.  Ultrasound of gallbladder completed showing no suspicious acute 

changes.  Chest x-ray completed showing no acute process.





2.  History of asthma





3.  History of COPD





4.  History of hyperlipidemia





5.  History of essential hypertension.





DVT prophylaxis heparin.  GI prophylaxis Protonix


Time with Patient: Greater than 30 (Greater than 60% of the total time spent in 

counseling and coordination of care. I performed an examination of the patient 

and discussed their management with the Nurse Practitioner.  I have reviewed 

the Nurse Practitioner's notes and agree with the documented findings and plan 

of care)

## 2019-01-17 NOTE — ECHOF
Referral Reason:cp



MEASUREMENTS

--------

HEIGHT: 167.6 cm

WEIGHT: 70.8 kg

BP: 121/76

RVIDd:   3.5 cm     (< 3.3)

IVSd:   1.1 cm     (0.6 - 1.1)

LVIDd:   3.7 cm     (3.9 - 5.3)

LVPWd:   1.2 cm     (0.6 - 1.1)

IVSs:   1.5 cm

LVIDs:   2.9 cm

LVPWs:   1.9 cm

LA Diam:   3.6 cm     (2.7 - 3.8)

LAESV Index (A-L):   33.37 ml/m

Ao Diam:   3.0 cm     (2.0 - 3.7)

AV Cusp:   2.0 cm     (1.5 - 2.6)

EPSS:   0.2 cm

MV E Roberto Carlos:   0.66 m/s

MV DecT:   258 ms

MV A Roberto Carlos:   0.64 m/s

MV E/A Ratio:   1.03 

RAP:   5.00 mmHg

RVSP:   23.97 mmHg

MV EF SLOPE:   60.36 mm/s     (70 - 150)

MV EXCURSION:   1.50 cm     (> 18.000)







FINDINGS

--------

Sinus rhythm.

This was a technically good study.

The left ventricular size is normal.   There is borderline concentric left ventricular hypertrophy.  
 Overall left ventricular systolic function is normal with, an EF between 60 - 65 %.

The right ventricle is mildly enlarged.

LA is midly dilated 29-33ml/m2.

The right atrium is normal in size.

Trace amount of aortic regurgitation.

There is trace to mild mitral regurgitation.

Mild tricuspid regurgitation present.   Right ventricular systolic pressure is normal at < 35 mmHg.

Trace/mild (physiologic)  pulmonic regurgitation.

The aortic root size is normal.

Normal inferior vena cava with normal inspiratory collapse consistent with estimated right atrial pre
ssure of  5 mmHg.

There is no pericardial effusion.



CONCLUSIONS

--------

1. Sinus rhythm.

2. This was a technically good study.

3. The left ventricular size is normal.

4. There is borderline concentric left ventricular hypertrophy.

5. Overall left ventricular systolic function is normal with, an EF between 60 - 65 %.

6. The right ventricle is mildly enlarged.

7. LA is midly dilated 29-33ml/m2.

8. The right atrium is normal in size.

9. Trace amount of aortic regurgitation.

10. There is trace to mild mitral regurgitation.

11. Mild tricuspid regurgitation present.

12. Right ventricular systolic pressure is normal at < 35 mmHg.

13. Trace/mild (physiologic)  pulmonic regurgitation.

14. The aortic root size is normal.

15. Normal inferior vena cava with normal inspiratory collapse consistent with estimated right atrial
 pressure of  5 mmHg.

16. There is no pericardial effusion.





SONOGRAPHER: HARLAN Palumbo

## 2019-01-22 ENCOUNTER — HOSPITAL ENCOUNTER (OUTPATIENT)
Dept: HOSPITAL 47 - ORWHC2ENDO | Age: 64
Discharge: HOME | End: 2019-01-22
Payer: COMMERCIAL

## 2019-01-22 VITALS — BODY MASS INDEX: 24.2 KG/M2

## 2019-01-22 VITALS — HEART RATE: 66 BPM

## 2019-01-22 VITALS — SYSTOLIC BLOOD PRESSURE: 110 MMHG | RESPIRATION RATE: 18 BRPM | DIASTOLIC BLOOD PRESSURE: 73 MMHG

## 2019-01-22 VITALS — TEMPERATURE: 98.4 F

## 2019-01-22 DIAGNOSIS — Z79.891: ICD-10-CM

## 2019-01-22 DIAGNOSIS — Z87.891: ICD-10-CM

## 2019-01-22 DIAGNOSIS — M19.90: ICD-10-CM

## 2019-01-22 DIAGNOSIS — I10: ICD-10-CM

## 2019-01-22 DIAGNOSIS — K31.9: Primary | ICD-10-CM

## 2019-01-22 DIAGNOSIS — K29.70: ICD-10-CM

## 2019-01-22 DIAGNOSIS — K44.9: ICD-10-CM

## 2019-01-22 DIAGNOSIS — E78.5: ICD-10-CM

## 2019-01-22 DIAGNOSIS — Z79.899: ICD-10-CM

## 2019-01-22 DIAGNOSIS — K21.0: ICD-10-CM

## 2019-01-22 DIAGNOSIS — J44.9: ICD-10-CM

## 2019-01-22 PROCEDURE — 43239 EGD BIOPSY SINGLE/MULTIPLE: CPT

## 2019-01-22 PROCEDURE — 88305 TISSUE EXAM BY PATHOLOGIST: CPT

## 2019-01-22 NOTE — P.PCN
Date of Procedure: 01/22/19


Procedure(s) Performed: 


Procedure: Esophagogastroduodenoscopy and biopsy.





Preoperative diagnosis: Postprandial abdominal pain of recent onset and 

suspected reflux issues.





Postoperative diagnosis: 1. Small sliding hiatal hernia with no obvious 

esophagitis or complicated reflux disease.  2. Antral gastritis.  3. Multiple 

biopsies obtained from the duodenum, antrum and esophagus area





Preparation and sedation were provided by anesthesia.





Brief clinical history: The patient is a 63-year-old male who is scheduled for 

this evaluation because of recent onset of postprandial bloating and abdominal 

distress making him not able to eat.  He denies dysphagia as such.  He 

complains of regurgitation and burping and has lost 10 pounds in 2-3 weeks.





Procedure: With the patient on his left lateral decubitus position and after 

informed consent and adequate sedation, the video Olympus- video upper 

endoscope was used and was advanced under direct vision through the 

cricopharyngeus down the esophagus.  GE junction was around 38 cm from the 

incisors and there was a small sliding hiatal hernia but no obvious esophagitis 

or complicated reflux disease.  The endoscope was then passed into the stomach 

which was insufflated with air and inspected in detail including the retroflex 

view in the cardia.  There was prominent folds in the immediate prepyloric area 

and there was mottling and erythema consistent with gastritis but no ulcers or 

gastric outlet obstruction.  Pyloric channel did not show any ulcers.  Duodenal 

bulb, post bulbar area and descending duodenum appeared within normal limits.  

Because of his symptoms, I obtained biopsies from the duodenum, antrum and 

esophagus then the endoscope was withdrawn.





The patient tolerated the procedure well.





Plan: The patient was reassured.  Will await biopsy results.  Further plans can 

be made based on his course and biopsy results.  He will follow up with you as 

planned and I will be happy to see in the office of his symptoms persist.

## 2019-06-28 ENCOUNTER — HOSPITAL ENCOUNTER (OUTPATIENT)
Dept: HOSPITAL 47 - RADCTMAIN | Age: 64
Discharge: HOME | End: 2019-06-28
Payer: MEDICARE

## 2019-06-28 DIAGNOSIS — J32.4: Primary | ICD-10-CM

## 2019-06-28 PROCEDURE — 70486 CT MAXILLOFACIAL W/O DYE: CPT

## 2019-06-28 NOTE — CT
EXAMINATION TYPE: CT sinus wo con

 

DATE OF EXAM: 6/28/2019

 

COMPARISON: None

 

HISTORY: chronic sinus congestion

 

CT DLP: 440.7 mGycm

 

Unenhanced CT of the paranasal sinuses was performed in the axial and coronal planes.  Bone and soft 
tissue settings are submitted.

 

There is moderate opacification of the maxillary sinuses,  sphenoid sinus and frontal sinus. There is
 severe mucosal thickening or underlying polyposis involving the ethmoid air cells.

 

The osteal meatal units are obstructed bilaterally.

 

The nasal septum is midline.  

 

No bony destructive changes are seen within the field of view.

 

IMPRESSION: 

 

Moderately severe pansinusitis. Underlying polyposis is not excluded.

## 2022-09-15 NOTE — ED
General Adult HPI





- General


Chief complaint: Shortness of Breath


Stated complaint: Swollen throat/hard time breathing


Time Seen by Provider: 01/01/19 14:00


Source: patient, RN notes reviewed


Mode of arrival: ambulatory


Limitations: no limitations





- History of Present Illness


Initial comments: 





This a 63-year-old male presents emergency Department complaining that he has 

throat pain since Christmas.  Patient states the pain is been getting worse 

since Christmas and he states he feels like sometimes he can't get his breath.  

Patient states since she's been sitting here couple hours in the waiting room 

he feels as though that sensation has improved but the pain remains.  Patient 

denies any fever chills.  Patient denies any chest pain.  Patient denies any 

cough.  Patient denies abdominal pain patient denies nausea or vomiting.  

Patient states she's been able to eat fine and when he wants to take a deep 

breath he can take a deep breath.





- Related Data


 Home Medications











 Medication  Instructions  Recorded  Confirmed


 


Atorvastatin [Lipitor] 10 mg PO DAILY 11/16/15 01/01/19


 


HYDROcodone/APAP 7.5-325MG [Norco 1 tab PO Q8HR PRN 12/25/18 01/01/19





7.5-325]   


 


Metoprolol Tartrate [Lopressor] 25 mg PO DAILY 12/25/18 01/01/19


 


amLODIPine BESYLATE/BENAZEPRIL 1 cap PO DAILY 12/25/18 01/01/19





[amLODIPine BESYLATE/BENAZEPRIL   





10-20 mg]   


 


hydrALAZINE HCL [Apresoline] 25 mg PO BID 12/25/18 01/01/19


 


Albuterol Inhaler [Ventolin Hfa 1 - 2 puff INHALATION RT-Q6H PRN 01/01/19 01/01/ 19





Inhaler]   








 Previous Rx's











 Medication  Instructions  Recorded


 


Amoxicillin/Potassium Clav 1 each PO Q12HR #28 tab 01/01/19





[Augmentin 875-125 Tablet]  











 Allergies











Allergy/AdvReac Type Severity Reaction Status Date / Time


 


No Known Allergies Allergy   Verified 01/01/19 15:15














Review of Systems


ROS Statement: 


Those systems with pertinent positive or pertinent negative responses have been 

documented in the HPI.





ROS Other: All systems not noted in ROS Statement are negative.





Past Medical History


Past Medical History: Asthma, COPD, Hypertension


Additional Past Medical History / Comment(s): hypercholesteremia; asthma as 

child


History of Any Multi-Drug Resistant Organisms: None Reported


Additional Past Surgical History / Comment(s): colonoscopy


Past Anesthesia/Blood Transfusion Reactions: No Reported Reaction


Past Psychological History: No Psychological Hx Reported


Smoking Status: Light tobacco smoker


Past Alcohol Use History: Daily


Past Drug Use History: None Reported





- Past Family History


  ** Father


Family Medical History: Congestive Heart Failure (CHF), Diabetes Mellitus


Additional Family Medical History / Comment(s): passed at age 62.





General Exam





- General Exam Comments


Initial Comments: 





GENERAL:


Patient is well-developed and well-nourished.  Patient is nontoxic and well-

hydrated and is in no acute distress.





ENT:


Neck is soft and supple.  No significant lymphadenopathy is noted.  Oropharynx 

is clear.  Moist mucous membranes.  Neck has full range of motion without 

eliciting any pain.  I saw no swelling in the oropharynx or any erythema.  

There was no fullness on palpation or masses palpated on an examination of the 

neck





EYES:


The sclera were anicteric and conjunctiva were pink and moist.  Extraocular 

movements were intact and pupils were equal round and reactive to light.  

Eyelids were unremarkable.





PULMONARY:


Unlabored respirations.  Good breath sounds bilaterally.  No audible rales 

rhonchi or wheezing was noted.





CARDIOVASCULAR:


There is a regular rate and rhythm without any murmurs gallops or rubs.





ABDOMEN:


Soft and nontender with normal bowel sounds.  





SKIN:


Skin is clear with no lesions or rashes and otherwise unremarkable.





NEUROLOGIC:


Patient is alert and oriented x3.  Cranial nerves II through XII are grossly 

intact.  Motor and sensory are also intact.  Normal speech, volume and content.

  Symmetrical smile. 





MUSCULOSKELETAL:


Normal extremities with adequate strength and full range of motion.  





LYMPHATICS:


No significant lymphadenopathy is noted





PSYCHIATRIC:


Normal psychiatric evaluation.  


Limitations: no limitations





Course


 Vital Signs











  01/01/19





  13:54


 


Temperature 98.3 F


 


Pulse Rate 104 H


 


Respiratory 20





Rate 


 


Blood Pressure 156/89


 


O2 Sat by Pulse 98





Oximetry 














Medical Decision Making





- Medical Decision Making





EKG shows normal sinus rhythm at 82 bpm ND interval is on her 72 QRS is 74 QT 

interval 360 QTC is 429.  Patient's EKG shows no ST segment elevation or 

depression or T wave abnormalities are noted.





Patient's CAT scan of the soft tissue of the neck showed maxillary and ethmoid 

sinusitis but no explanation for the throat soreness.





- Lab Data


Result diagrams: 


 01/01/19 16:06





 01/01/19 16:06


 Lab Results











  01/01/19 01/01/19 01/01/19 Range/Units





  16:06 16:06 16:30 


 


WBC  7.1    (3.8-10.6)  k/uL


 


RBC  4.77    (4.30-5.90)  m/uL


 


Hgb  15.0    (13.0-17.5)  gm/dL


 


Hct  45.4    (39.0-53.0)  %


 


MCV  95.1    (80.0-100.0)  fL


 


MCH  31.4    (25.0-35.0)  pg


 


MCHC  33.0    (31.0-37.0)  g/dL


 


RDW  11.9    (11.5-15.5)  %


 


Plt Count  297    (150-450)  k/uL


 


Neutrophils %  66    %


 


Lymphocytes %  21    %


 


Monocytes %  6    %


 


Eosinophils %  4    %


 


Basophils %  1    %


 


Neutrophils #  4.7    (1.3-7.7)  k/uL


 


Lymphocytes #  1.5    (1.0-4.8)  k/uL


 


Monocytes #  0.4    (0-1.0)  k/uL


 


Eosinophils #  0.3    (0-0.7)  k/uL


 


Basophils #  0.1    (0-0.2)  k/uL


 


Sodium   137   (137-145)  mmol/L


 


Potassium   4.4   (3.5-5.1)  mmol/L


 


Chloride   103   ()  mmol/L


 


Carbon Dioxide   24   (22-30)  mmol/L


 


Anion Gap   10   mmol/L


 


BUN   14   (9-20)  mg/dL


 


Creatinine   0.60 L   (0.66-1.25)  mg/dL


 


Est GFR (CKD-EPI)AfAm   >90   (>60 ml/min/1.73 sqM)  


 


Est GFR (CKD-EPI)NonAf   >90   (>60 ml/min/1.73 sqM)  


 


Glucose   106 H   (74-99)  mg/dL


 


Calcium   10.0   (8.4-10.2)  mg/dL


 


Total Bilirubin   0.7   (0.2-1.3)  mg/dL


 


AST   27   (17-59)  U/L


 


ALT   38   (21-72)  U/L


 


Alkaline Phosphatase   47   ()  U/L


 


Total Protein   7.9   (6.3-8.2)  g/dL


 


Albumin   4.8   (3.5-5.0)  g/dL


 


Group A Strep Rapid    Negative  (Negative)  














Disposition


Clinical Impression: 


 Sinusitis





Disposition: HOME SELF-CARE


Condition: Good


Instructions:  Sinusitis (ED)


Prescriptions: 


Amoxicillin/Potassium Clav [Augmentin 875-125 Tablet] 1 each PO Q12HR #28 tab


Is patient prescribed a controlled substance at d/c from ED?: No


Referrals: 


Andreina Mclean MD [Primary Care Provider] - 1-2 days Patient's room air sats are 92-94% while sleeping secondary to sleep apena.